# Patient Record
Sex: FEMALE | Race: WHITE | NOT HISPANIC OR LATINO | Employment: OTHER | URBAN - NONMETROPOLITAN AREA
[De-identification: names, ages, dates, MRNs, and addresses within clinical notes are randomized per-mention and may not be internally consistent; named-entity substitution may affect disease eponyms.]

---

## 2022-05-19 ENCOUNTER — OFFICE VISIT (OUTPATIENT)
Dept: PULMONOLOGY | Facility: CLINIC | Age: 67
End: 2022-05-19

## 2022-05-19 VITALS
HEIGHT: 62 IN | SYSTOLIC BLOOD PRESSURE: 134 MMHG | WEIGHT: 173 LBS | BODY MASS INDEX: 31.83 KG/M2 | HEART RATE: 70 BPM | OXYGEN SATURATION: 95 % | DIASTOLIC BLOOD PRESSURE: 80 MMHG

## 2022-05-19 DIAGNOSIS — J43.9 PULMONARY EMPHYSEMA, UNSPECIFIED EMPHYSEMA TYPE: Primary | ICD-10-CM

## 2022-05-19 DIAGNOSIS — Z87.891 HISTORY OF SMOKING: ICD-10-CM

## 2022-05-19 DIAGNOSIS — J47.9 BRONCHIECTASIS WITHOUT COMPLICATION: ICD-10-CM

## 2022-05-19 DIAGNOSIS — I25.10 CORONARY ARTERY DISEASE INVOLVING NATIVE CORONARY ARTERY OF NATIVE HEART WITHOUT ANGINA PECTORIS: ICD-10-CM

## 2022-05-19 PROCEDURE — 99204 OFFICE O/P NEW MOD 45 MIN: CPT | Performed by: NURSE PRACTITIONER

## 2022-05-19 RX ORDER — ASPIRIN 81 MG/1
81 TABLET, COATED ORAL DAILY
COMMUNITY
Start: 2022-05-11

## 2022-05-19 RX ORDER — CLOPIDOGREL BISULFATE 75 MG/1
75 TABLET ORAL DAILY
COMMUNITY
Start: 2022-05-13

## 2022-05-19 RX ORDER — ROSUVASTATIN CALCIUM 40 MG/1
40 TABLET, COATED ORAL DAILY
COMMUNITY
Start: 2022-05-11

## 2022-05-19 RX ORDER — LOSARTAN POTASSIUM 50 MG/1
50 TABLET ORAL DAILY
COMMUNITY
Start: 2022-03-05

## 2022-05-19 NOTE — PROGRESS NOTES
New Pulmonary Patient Office Visit - [place]     Patient Name: Salina Schroeder    Referring Physician: SHELDON Jones    Chief Complaint:    Chief Complaint   Patient presents with   • Consult   • Shortness of Breath       History of Present Illness: Salina Schroeder is a 66 y.o. female who is here today to establish care with Pulmonary for emphysema.  Patient denies being formerly diagnosed with this.  She had a history of dyspnea which resolved with coronary stent placement last year.  She is currently asymptomatic from a pulmonary standpoint and denies any dyspnea.  She does have a high risk smoking history though quit in November 2021.    Duration: Noted on chest imaging earlier this year  Context: Walks 1.5-2 miles daily  Associated Symptoms: No exertional dyspnea, no cough, no wheezing, no fevers, no hemoptysis, no unintended weight loss  Modifying Factors: Dyspnea in the past resolved after coronary stent placement  Supplemental Oxygen: No  Cardiac History: CAD s/p stents, sees Dr. Potts  Last Steroids: Never for breathing trouble  Occupational history: Crop farming    Subjective      Review of Systems:   Review of Systems   Constitutional: Negative for fever and unexpected weight change.   Respiratory: Negative for cough, shortness of breath and wheezing.    Cardiovascular: Negative for chest pain.      Past Medical History:   Past Medical History:   Diagnosis Date   • coronary artery stent 11/2021   • GERD (gastroesophageal reflux disease)    • Heart attack (HCC) 11/2021       Past Surgical History: History reviewed. No pertinent surgical history.    Family History:   Family History   Problem Relation Age of Onset   • Stroke Mother    • Stroke Father    • Stroke Brother        Social History:   Social History     Socioeconomic History   • Marital status:    Tobacco Use   • Smoking status: Former Smoker     Packs/day: 1.50     Years: 30.00     Pack years: 45.00     Types: Cigarettes     Quit  "date: 2021     Years since quittin.5       Medications:     Current Outpatient Medications:   •  Aspirin Low Dose 81 MG EC tablet, Take 81 mg by mouth Daily., Disp: , Rfl:   •  clopidogrel (PLAVIX) 75 MG tablet, Take 75 mg by mouth Daily., Disp: , Rfl:   •  losartan (COZAAR) 50 MG tablet, Take 50 mg by mouth Daily., Disp: , Rfl:   •  metoprolol tartrate (LOPRESSOR) 25 MG tablet, Take 12.5 mg by mouth 2 (Two) Times a Day With Meals., Disp: , Rfl:   •  rosuvastatin (CRESTOR) 40 MG tablet, Take 40 mg by mouth Daily., Disp: , Rfl:     Allergies:   No Known Allergies    Objective     Physical Exam:  Vital Signs:   Vitals:    22 1330   BP: 134/80   Pulse: 70   SpO2: 95%   Weight: 78.5 kg (173 lb)   Height: 157.5 cm (62\")     Body mass index is 31.64 kg/m².    Physical Exam  Constitutional:       General: She is not in acute distress.     Appearance: She is obese. She is not toxic-appearing.   HENT:      Head: Normocephalic and atraumatic.   Eyes:      Extraocular Movements: Extraocular movements intact.   Cardiovascular:      Rate and Rhythm: Normal rate and regular rhythm.      Heart sounds: Normal heart sounds.   Pulmonary:      Effort: Pulmonary effort is normal.      Breath sounds: Normal breath sounds.   Abdominal:      General: There is no distension.   Musculoskeletal:         General: No swelling.      Cervical back: Neck supple.   Skin:     General: Skin is warm and dry.      Findings: No rash.   Neurological:      General: No focal deficit present.      Mental Status: She is alert and oriented to person, place, and time.   Psychiatric:         Mood and Affect: Mood normal.         Behavior: Behavior normal.       Results Review:   2022 low-dose lung cancer screening chest CT scan showed emphysematous change.  Bronchiectasis noted in both lower lobes with no bronchial plugging identified.  No pulmonary mass.  Enlarged lymph nodes in the prevascular space measuring up to 1.1 cm, nonspecific.  " Large hiatal hernia.  Extensive atherosclerotic change of the aorta.    Assessment / Plan      Assessment/Plan:    Diagnoses and all orders for this visit:    1. Pulmonary emphysema, unspecified emphysema type (HCC) (Primary)  -     Alpha - 1 - Antitrypsin Deficiency; Future  -     Pulmonary Function Test; Future  Noted on recent LDCT chest.  This is a new diagnosis for the patient.  She is currently asymptomatic.  Further assessed with full PFTs and alpha-1 testing.  Will determine need for medication management at follow-up visit once results are reviewed with patient. Will have outside imaging on disc loaded into the radiology system.     2. Bronchiectasis without complication (HCC)  Noted on recent chest imaging.  Patient is without exacerbation symptoms or difficulty with respiratory secretions.  Continue to monitor symptoms.     3. History of smoking  Patient with high risk smoking history.  Ongoing cessation advised.  Recommend continuing with annual lung cancer screening scans.    4. Coronary artery disease involving native coronary artery of native heart without angina pectoris  Status post coronary stents in 2021.  Following with cardiology.    Follow Up:   Return in about 3 months (around 8/19/2022) for Recheck.  The patient was counseled on diagnostic results, risks and benefits of treatment options, risk factor modifications and the importance of treatment compliance. The patient was advised to contact the clinic with concerns or worsening symptoms.     SHELDON Do  Pulmonary Medicine [place]    Please note that portions of this note may have been completed with a voice recognition program. Efforts were made to edit the dictations, but occasionally words are mistranscribed

## 2023-05-15 DIAGNOSIS — J43.9 PULMONARY EMPHYSEMA, UNSPECIFIED EMPHYSEMA TYPE: ICD-10-CM

## 2025-05-13 ENCOUNTER — OUTSIDE FACILITY SERVICE (OUTPATIENT)
Dept: CARDIOLOGY | Facility: CLINIC | Age: 70
End: 2025-05-13
Payer: MEDICARE